# Patient Record
Sex: FEMALE | Race: WHITE
[De-identification: names, ages, dates, MRNs, and addresses within clinical notes are randomized per-mention and may not be internally consistent; named-entity substitution may affect disease eponyms.]

---

## 2017-09-18 ENCOUNTER — HOSPITAL ENCOUNTER (OUTPATIENT)
Dept: HOSPITAL 22 - LAB | Age: 22
End: 2017-09-18
Attending: FAMILY MEDICINE
Payer: COMMERCIAL

## 2017-09-18 DIAGNOSIS — Z01.84: Primary | ICD-10-CM

## 2018-08-29 ENCOUNTER — APPOINTMENT (OUTPATIENT)
Age: 23
Setting detail: DERMATOLOGY
End: 2018-08-29

## 2018-08-29 DIAGNOSIS — L98.8 OTHER SPECIFIED DISORDERS OF THE SKIN AND SUBCUTANEOUS TISSUE: ICD-10-CM

## 2018-08-29 DIAGNOSIS — L40.0 PSORIASIS VULGARIS: ICD-10-CM

## 2018-08-29 PROBLEM — F32.9 MAJOR DEPRESSIVE DISORDER, SINGLE EPISODE, UNSPECIFIED: Status: ACTIVE | Noted: 2018-08-29

## 2018-08-29 PROBLEM — M12.9 ARTHROPATHY, UNSPECIFIED: Status: ACTIVE | Noted: 2018-08-29

## 2018-08-29 PROBLEM — F41.9 ANXIETY DISORDER, UNSPECIFIED: Status: ACTIVE | Noted: 2018-08-29

## 2018-08-29 PROCEDURE — OTHER PRESCRIPTION: OTHER

## 2018-08-29 PROCEDURE — 99202 OFFICE O/P NEW SF 15 MIN: CPT

## 2018-08-29 PROCEDURE — OTHER COUNSELING: OTHER

## 2018-08-29 PROCEDURE — OTHER PRESCRIPTION MEDICATION MANAGEMENT: OTHER

## 2018-08-29 RX ORDER — CLOBETASOL PROPIONATE 0.46 MG/ML
SOLUTION TOPICAL
Qty: 50 | Refills: 1 | Status: ERX | COMMUNITY
Start: 2018-08-29

## 2018-08-29 ASSESSMENT — LOCATION ZONE DERM
LOCATION ZONE: LEG
LOCATION ZONE: SCALP

## 2018-08-29 ASSESSMENT — LOCATION DETAILED DESCRIPTION DERM
LOCATION DETAILED: RIGHT PROXIMAL POSTERIOR THIGH
LOCATION DETAILED: LEFT PROXIMAL POSTERIOR THIGH
LOCATION DETAILED: LEFT SUPERIOR PARIETAL SCALP

## 2018-08-29 ASSESSMENT — LOCATION SIMPLE DESCRIPTION DERM
LOCATION SIMPLE: LEFT POSTERIOR THIGH
LOCATION SIMPLE: RIGHT POSTERIOR THIGH
LOCATION SIMPLE: SCALP

## 2018-08-29 NOTE — PROCEDURE: COUNSELING
Detail Level: Simple
Patient Specific Counseling (Will Not Stick From Patient To Patient): Pt reports joint pain in hands. Pt reports flaring has increased. Cannot r/o whether the symptoms are c/w arthritis or tendonitis. Pt aware to f/u with rheumatologist.
Detail Level: Zone

## 2018-08-29 NOTE — HPI: RASH
How Severe Is Your Rash?: mild
Is This A New Presentation, Or A Follow-Up?: Rash
Additional History: Pt reports when she is hot a rash forms behind the legs. Pt would like to discuss treatment options.

## 2018-08-29 NOTE — PROCEDURE: PRESCRIPTION MEDICATION MANAGEMENT
Detail Level: Simple
Initiate Treatment: Clobetasol Solution
Initiate Treatment: Clobetasol scalp Solution

## 2018-08-29 NOTE — HPI: RASH (DANDRUFF)
How Severe Is It?: moderate
Is This A New Presentation, Or A Follow-Up?: Dandruff
Additional History: Pt reports T-gel shampoo and T-tree shampoos have alleviated symptoms. Pt reports pain on the scalp.

## 2018-10-08 ENCOUNTER — APPOINTMENT (OUTPATIENT)
Age: 23
Setting detail: DERMATOLOGY
End: 2018-10-08

## 2018-10-08 ENCOUNTER — RX ONLY (RX ONLY)
Age: 23
End: 2018-10-08

## 2018-10-08 DIAGNOSIS — L40.0 PSORIASIS VULGARIS: ICD-10-CM

## 2018-10-08 PROCEDURE — OTHER COUNSELING: OTHER

## 2018-10-08 PROCEDURE — OTHER PRESCRIPTION MEDICATION MANAGEMENT: OTHER

## 2018-10-08 PROCEDURE — 99212 OFFICE O/P EST SF 10 MIN: CPT

## 2018-10-08 RX ORDER — CLOBETASOL PROPIONATE 0.46 MG/ML
SOLUTION TOPICAL
Qty: 50 | Refills: 5 | Status: ERX

## 2018-10-08 ASSESSMENT — LOCATION SIMPLE DESCRIPTION DERM: LOCATION SIMPLE: SCALP

## 2018-10-08 ASSESSMENT — LOCATION ZONE DERM: LOCATION ZONE: SCALP

## 2018-10-08 ASSESSMENT — LOCATION DETAILED DESCRIPTION DERM: LOCATION DETAILED: LEFT SUPERIOR PARIETAL SCALP

## 2018-10-08 NOTE — PROCEDURE: COUNSELING
Patient Specific Counseling (Will Not Stick From Patient To Patient): Pt reports joint pain in hands. Pt reports flaring has increased. Cannot r/o whether the symptoms are c/w arthritis or tendonitis. Pt aware to f/u with rheumatologist.\\nAdvised pt that I don't know any rheumatologists in [place].  I recommend contact insurance or PCP for referral options.
Detail Level: Zone

## 2018-10-08 NOTE — PROCEDURE: PRESCRIPTION MEDICATION MANAGEMENT
Detail Level: Simple
Plan: Consider Taclonex or biologic if flaring persist and the Clobetasol scalp Solution is too harsh. Decrease use if Clobetasol Solution to every other day unless aggressively flared. RTC if symptoms recur frequently.
Render In Strict Bullet Format?: No
Continue Regimen: Clobetasol scalp Solution

## 2018-12-19 ENCOUNTER — HOSPITAL ENCOUNTER (OUTPATIENT)
Age: 23
End: 2018-12-19
Payer: COMMERCIAL

## 2018-12-19 DIAGNOSIS — R07.9: Primary | ICD-10-CM

## 2018-12-19 PROCEDURE — 93005 ELECTROCARDIOGRAM TRACING: CPT

## 2018-12-19 PROCEDURE — 93306 TTE W/DOPPLER COMPLETE: CPT

## 2019-09-04 RX ORDER — LEVONORGESTREL AND ETHINYL ESTRADIOL 0.1-0.02MG
1 KIT ORAL DAILY
COMMUNITY

## 2019-09-05 ENCOUNTER — OFFICE VISIT (OUTPATIENT)
Dept: SURGERY | Facility: CLINIC | Age: 24
End: 2019-09-05

## 2019-09-05 VITALS
TEMPERATURE: 98 F | SYSTOLIC BLOOD PRESSURE: 128 MMHG | OXYGEN SATURATION: 98 % | HEIGHT: 67 IN | HEART RATE: 74 BPM | WEIGHT: 153 LBS | BODY MASS INDEX: 24.01 KG/M2 | DIASTOLIC BLOOD PRESSURE: 90 MMHG

## 2019-09-05 DIAGNOSIS — K60.2 ANAL FISSURE: Primary | ICD-10-CM

## 2019-09-05 DIAGNOSIS — R19.8 IRREGULAR BOWEL HABITS: ICD-10-CM

## 2019-09-05 PROCEDURE — 99243 OFF/OP CNSLTJ NEW/EST LOW 30: CPT | Performed by: PHYSICIAN ASSISTANT

## 2019-09-05 RX ORDER — EZETIMIBE 10 MG/1
10 TABLET ORAL DAILY
COMMUNITY

## 2019-09-05 NOTE — PROGRESS NOTES
Concepcion Rousseau is a 23 y.o. female who is seen as a consult at the request of Dr. Flaco Coto for anorectal pain and bleeding    HPI:    Pt c/o sensitive bowels.  Ever since cholecystectomy in 2014, she alternates between constipation and diarrhea.  When she is constipated, she feels tearing at the anus and notes bright red blood  She feels extra tissue on the outside of her anus  She applied HC cream, which has helped with itching    She eats oatmeal every morning, which is helpful for her BMs  She does not take a fiber supplement  She does not take a stool softener    She has never had anorectal surgery  No prior history fissure, fistula, abscess    No known family history of colon polyps or colon cancer    She is a pharmacy student    Past Medical History:   Diagnosis Date   • Diarrhea of infectious origin 02/21/2019    SEEN AT [facility]   • IBS (irritable bowel syndrome)    • Right-sided thoracic back pain 08/04/2017    SEEN AT [facility]       Past Surgical History:   Procedure Laterality Date   • CHOLECYSTECTOMY N/A 2014   • KNEE ARTHROSCOPY Right 2014   • TONSILLECTOMY Bilateral     DURING CHILDHOOD   • WISDOM TOOTH EXTRACTION Bilateral 2016       Social History:   reports that she has never smoked. She has never used smokeless tobacco. She reports that she drinks alcohol. She reports that she does not use drugs.      Marriage status: Single    Family History   Problem Relation Age of Onset   • Diabetes Father          Current Outpatient Medications:   •  ezetimibe (ZETIA) 10 MG tablet, Take 10 mg by mouth Daily., Disp: , Rfl:   •  levonorgestrel-ethinyl estradiol (AVIANE,ALESSE,LESSINA) 0.1-20 MG-MCG per tablet, Take 1 tablet by mouth Daily., Disp: , Rfl:     Allergy  Penicillins and Peroxyl [hydrogen peroxide-benzy alc]    Review of Systems   Constitution: Negative for decreased appetite, weakness and weight gain.   HENT: Positive for congestion. Negative for hearing loss and hoarse voice.    Eyes:  Negative for blurred vision, discharge and visual disturbance.   Cardiovascular: Negative for chest pain, cyanosis and leg swelling.   Respiratory: Positive for cough, shortness of breath and sleep disturbances due to breathing. Negative for snoring.    Endocrine: Negative for cold intolerance and heat intolerance.   Hematologic/Lymphatic: Does not bruise/bleed easily.   Skin: Negative for itching, poor wound healing and skin cancer.   Musculoskeletal: Negative for arthritis, back pain, joint pain and joint swelling.   Gastrointestinal: Positive for change in bowel habit, bowel incontinence and constipation. Negative for abdominal pain.   Genitourinary: Negative for bladder incontinence, dysuria and hematuria.   Neurological: Positive for headaches and light-headedness. Negative for brief paralysis, excessive daytime sleepiness, dizziness and focal weakness.   Psychiatric/Behavioral: Negative for altered mental status and hallucinations. The patient does not have insomnia.    Allergic/Immunologic: Negative for HIV exposure and persistent infections.   All other systems reviewed and are negative.      Vitals:    09/05/19 1508   BP: 128/90   Pulse: 74   Temp: 98 °F (36.7 °C)   SpO2: 98%     Body mass index is 23.96 kg/m².    Physical Exam   Constitutional: She is oriented to person, place, and time. She appears well-developed and well-nourished. No distress.   HENT:   Head: Normocephalic and atraumatic.   Eyes: Pupils are equal, round, and reactive to light.   Neck: No tracheal deviation present.   Pulmonary/Chest: Effort normal. No respiratory distress.   Abdominal: She exhibits no distension.   Genitourinary:   Genitourinary Comments: Perianal exam: external: posterior fissure with associated sentinel pile   Neurological: She is alert and oriented to person, place, and time.   Skin: Skin is warm and dry. She is not diaphoretic.   Psychiatric: She has a normal mood and affect. Her behavior is normal.   Vitals  reviewed.      Review of Medical Record: no pertinent records available for review    Assessment:  1. Anal fissure    2. Irregular bowel habits        Plan:      I discussed with patient options on treating fissure: lateral internal anal sphincterotomy, chemical sphincterotomy with Botox, or topical creams of nitroglycerin, diltiazem, or nifedipine.  I discussed risks and benefits of all.  Risks of the lateral internal anal sphincterotomy are small chance of fecal incontinence, slow wound healing, and it is an invasive procedure versus the other 2 options, but the benefit is 97% success in fixing a fissure.  Chemical sphincterotomy has the benefit of no permanent fecal incontinence but a 80-85% success rate.  The topical creams have no incontinence risk, but are slow to heal the fissure and are only 80% successful.    I wrote for diltiazem and lidocaine cream to be placed on the anus 3 times a day.  I recommended taking 1/4 to 1/2 dose MiraLAX and fiber daily.  I gave out written instructions.       Discussed would not recommend excision of sentinel pile, as it is scar tissue, and removing it would cause even more scar tissue.  She understands.    Patient to follow-up in 5 weeks.      Flower Reich PA-C  9/5/2019     40 minutes spent face-to-face with patient; 25 of 40 minutes spent in consultation

## 2019-10-09 ENCOUNTER — OFFICE VISIT (OUTPATIENT)
Dept: SURGERY | Facility: CLINIC | Age: 24
End: 2019-10-09

## 2019-10-09 VITALS
HEART RATE: 63 BPM | BODY MASS INDEX: 23.93 KG/M2 | DIASTOLIC BLOOD PRESSURE: 72 MMHG | SYSTOLIC BLOOD PRESSURE: 110 MMHG | OXYGEN SATURATION: 99 % | TEMPERATURE: 98.5 F | WEIGHT: 152.5 LBS | HEIGHT: 67 IN

## 2019-10-09 DIAGNOSIS — R19.8 IRREGULAR BOWEL HABITS: ICD-10-CM

## 2019-10-09 DIAGNOSIS — K60.2 ANAL FISSURE: Primary | ICD-10-CM

## 2019-10-09 DIAGNOSIS — Z83.71 FAMILY HISTORY OF COLONIC POLYPS: ICD-10-CM

## 2019-10-09 PROCEDURE — 99213 OFFICE O/P EST LOW 20 MIN: CPT | Performed by: COLON & RECTAL SURGERY

## 2019-10-09 RX ORDER — OLOPATADINE HYDROCHLORIDE 1 MG/ML
SOLUTION/ DROPS OPHTHALMIC
Refills: 10 | COMMUNITY
Start: 2019-09-06

## 2019-10-09 RX ORDER — CLINDAMYCIN HYDROCHLORIDE 150 MG/1
150 CAPSULE ORAL 3 TIMES DAILY
COMMUNITY

## 2019-10-09 NOTE — PROGRESS NOTES
"Concepcion Rousseau is a 24 y.o. female in for follow up of Anal fissure    Irregular bowel habits    Pt states her pain is much better.  No pain or bleeding with BMs    She is taking fiber gummies and miralax    She is applying dilt/lido cream bid    FamHx: colon polyps bother parents, discovered in the past few weeks    /72 (BP Location: Left arm, Patient Position: Sitting, Cuff Size: Adult)   Pulse 63   Temp 98.5 °F (36.9 °C) (Oral)   Ht 170.2 cm (67\")   Wt 69.2 kg (152 lb 8 oz)   SpO2 99%   BMI 23.88 kg/m²   Body mass index is 23.88 kg/m².      PE:  Physical Exam   Constitutional: She appears well-developed. No distress.   HENT:   Head: Normocephalic and atraumatic.   Abdominal: Soft. She exhibits no distension.   Musculoskeletal: Normal range of motion.   Neurological: She is alert.   Psychiatric: Thought content normal.         Assessment:   1. Anal fissure    2. Irregular bowel habits         Plan:    As she has had significant improvement in her symptoms, she should continue medical management.  Continue daily fiber supplement and prn miralax.  She should continue to apply dilt/lido for 2 weeks past resolution of symptoms.    For FamHx colon polyps in her parents, discussed she will need to begin screening colonoscopies at age 41.    Call or come in if symptoms recur, or if any questions or concerning symptoms      RTC prn      Scribed for Yared Felix MD by Flower Reich PA-C  10/9/2019   This patient was evaluated by me, recommendations made, documentation reviewed, edited, and revised by me, Yared Felix MD        "

## 2020-12-02 ENCOUNTER — TELEPHONE (OUTPATIENT)
Dept: GENETICS | Facility: HOSPITAL | Age: 25
End: 2020-12-02

## 2021-02-01 ENCOUNTER — HOSPITAL ENCOUNTER (EMERGENCY)
Age: 26
Discharge: HOME | End: 2021-02-01
Payer: COMMERCIAL

## 2021-02-01 VITALS
TEMPERATURE: 98.24 F | OXYGEN SATURATION: 98 % | SYSTOLIC BLOOD PRESSURE: 121 MMHG | HEART RATE: 76 BPM | RESPIRATION RATE: 18 BRPM | DIASTOLIC BLOOD PRESSURE: 77 MMHG

## 2021-02-01 VITALS
SYSTOLIC BLOOD PRESSURE: 121 MMHG | OXYGEN SATURATION: 98 % | RESPIRATION RATE: 18 BRPM | DIASTOLIC BLOOD PRESSURE: 77 MMHG | HEART RATE: 76 BPM | TEMPERATURE: 98.24 F

## 2021-02-01 VITALS — BODY MASS INDEX: 24.3 KG/M2

## 2021-02-01 DIAGNOSIS — Z88.0: ICD-10-CM

## 2021-02-01 DIAGNOSIS — Z20.822: Primary | ICD-10-CM

## 2021-02-01 PROCEDURE — 99202 OFFICE O/P NEW SF 15 MIN: CPT

## 2021-02-01 PROCEDURE — G0463 HOSPITAL OUTPT CLINIC VISIT: HCPCS

## 2021-02-01 PROCEDURE — U0003 INFECTIOUS AGENT DETECTION BY NUCLEIC ACID (DNA OR RNA); SEVERE ACUTE RESPIRATORY SYNDROME CORONAVIRUS 2 (SARS-COV-2) (CORONAVIRUS DISEASE [COVID-19]), AMPLIFIED PROBE TECHNIQUE, MAKING USE OF HIGH THROUGHPUT TECHNOLOGIES AS DESCRIBED BY CMS-2020-01-R: HCPCS

## 2021-12-22 ENCOUNTER — HOSPITAL ENCOUNTER (OUTPATIENT)
Age: 26
End: 2021-12-22
Payer: COMMERCIAL

## 2021-12-22 DIAGNOSIS — Z20.822: Primary | ICD-10-CM

## 2021-12-22 PROCEDURE — C9803 HOPD COVID-19 SPEC COLLECT: HCPCS

## 2021-12-22 PROCEDURE — U0003 INFECTIOUS AGENT DETECTION BY NUCLEIC ACID (DNA OR RNA); SEVERE ACUTE RESPIRATORY SYNDROME CORONAVIRUS 2 (SARS-COV-2) (CORONAVIRUS DISEASE [COVID-19]), AMPLIFIED PROBE TECHNIQUE, MAKING USE OF HIGH THROUGHPUT TECHNOLOGIES AS DESCRIBED BY CMS-2020-01-R: HCPCS

## 2021-12-22 PROCEDURE — U0005 INFEC AGEN DETEC AMPLI PROBE: HCPCS

## 2022-08-10 ENCOUNTER — HOSPITAL ENCOUNTER (OUTPATIENT)
Age: 27
End: 2022-08-10
Payer: COMMERCIAL

## 2022-08-10 DIAGNOSIS — U07.1: Primary | ICD-10-CM

## 2022-08-10 LAB
HCT VFR BLD CALC: 40.7 % (ref 37–47)
HGB BLD-MCNC: 13.1 G/DL (ref 12.2–16.2)
MCHC RBC-ENTMCNC: 32.3 G/DL (ref 31.8–35.4)
MCV RBC: 95.4 FL (ref 81–99)
MEAN CORPUSCULAR HEMOGLOBIN: 30.8 PG (ref 27–31.2)
PLATELET # BLD: 204 K/MM3 (ref 142–424)
RBC # BLD AUTO: 4.26 M/MM3 (ref 4.2–5.4)
WBC # BLD AUTO: 8.8 K/MM3 (ref 4.8–10.8)

## 2022-08-10 PROCEDURE — C9803 HOPD COVID-19 SPEC COLLECT: HCPCS

## 2022-08-10 PROCEDURE — 87798 DETECT AGENT NOS DNA AMP: CPT

## 2022-08-10 PROCEDURE — 87632 RESP VIRUS 6-11 TARGETS: CPT

## 2022-08-10 PROCEDURE — 36415 COLL VENOUS BLD VENIPUNCTURE: CPT

## 2022-08-10 PROCEDURE — U0003 INFECTIOUS AGENT DETECTION BY NUCLEIC ACID (DNA OR RNA); SEVERE ACUTE RESPIRATORY SYNDROME CORONAVIRUS 2 (SARS-COV-2) (CORONAVIRUS DISEASE [COVID-19]), AMPLIFIED PROBE TECHNIQUE, MAKING USE OF HIGH THROUGHPUT TECHNOLOGIES AS DESCRIBED BY CMS-2020-01-R: HCPCS

## 2022-08-10 PROCEDURE — U0005 INFEC AGEN DETEC AMPLI PROBE: HCPCS

## 2022-08-10 PROCEDURE — 85025 COMPLETE CBC W/AUTO DIFF WBC: CPT

## 2022-08-10 PROCEDURE — 87581 M.PNEUMON DNA AMP PROBE: CPT

## 2022-08-17 ENCOUNTER — DOCUMENTATION (OUTPATIENT)
Dept: OTHER | Facility: HOSPITAL | Age: 27
End: 2022-08-17

## 2022-08-17 NOTE — PROGRESS NOTES
Concepcion Rousseau, a 26-year-old female, was referred for genetic counseling due to a family history of breast cancer. Genetic counseling was provided via telephone.  Ms. Rousseau has no personal history of cancer.  She was 11/12 years old at menarche and nulliparous. She is pre-menopausal and retains her uterus and ovaries.  Ms. Rousseau’s currently gets annual clinical breast exams. She has not yet had a colonoscopy.  She was interested in discussing her risk for a hereditary cancer syndrome.   Ms. Rousseau decided to pursue comprehensive genetic testing to evaluate her risk of cancer, therefore the CancerNext Panel was ordered through [facility] which analyzes 36 genes associated with an increased cancer risk. Genetic testing was negative for pathogenic mutations in BRCA1/2 and 34 additional genes on this panel. These normal results were discussed with Ms. Rousseau by telephone on 8/17/22.    PERTINENT FAMILY HISTORY: (See attached pedigree)   Mat Grandmother:   Breast cancer, 48      Reported PALB2 (BRCA3)+  Mat Great Aunt 1:  Breast cancer, 42  Mat Great Aunt 2:  Ovarian cancer, 49  Pat Grandmother:  Breast cancer    We do not have medical records regarding any of these diagnoses.       RISK ASSESSMENT:  Ms. Rousseau’s family history of breast cancer raises the question of a hereditary cancer syndrome.  NCCN guidelines for genetic testing for BRCA1/2 states that individuals with a close relative with breast cancer under the age of 45 may consider genetic testing. With Ms. Rousseau’s mother’s diagnosis being at age 48, she would not meet this criteria. Despite this, we discussed how genetic testing is still available to her.  This risk assessment is based on the family history information provided at the time of the appointment.  The assessment could change in the future should new information be obtained.    Because genetic testing was negative, her management should be guided by a family history-based risk assessment.  Phylliser-Hilariozick, version 8 is able to take into account personal factors (age at first menarche, age at first birth, etc.) and family history when calculating risk for breast cancer. Computer modeling estimates that Ms. Rousseau’s lifetime personal risk for developing breast cancer is up to 23.7% (Floridalma, v8), compared to the general population risk of 12.5%.  A risk greater than 20% warrants consideration of increased screening for Ms. Rousseau per NCCN guidelines.  This risk assessment is based on the family history information provided at the time of the appointment and could change in the future should new information be obtained.    GENETIC COUNSELING (30 minutes):  We reviewed the family history information in detail. Cases of cancer follow three general patterns: sporadic, familial, and hereditary.  While most cancer is sporadic, some cases appear to occur in family clusters.  These cases are said to be familial and account for 10-20% of cancer cases.  Familial cases may be due to a combination of shared genes and environmental factors among family members.  In even fewer cases, the risk for cancer is inherited, and the genes responsible for the increased cancer risk are known.       Family histories typical of hereditary cancer syndromes usually include multiple first- and second-degree relatives diagnosed with cancer types that define a syndrome.  These cases tend to be diagnosed at younger-than-expected ages and can be bilateral or multifocal.  The cancer in these families follows an autosomal dominant inheritance pattern, which indicates the likely presence of a mutation in a cancer susceptibility gene.  Children and siblings of an individual believed to carry this mutation have a 50% chance of inheriting that mutation, thereby inheriting the increased risk to develop cancer.  These mutations can be passed down from the maternal or the paternal lineage.     Due to Ms. Rousseau’s family history of breast  cancer, we discussed hereditary breast cancer. Hereditary breast cancer accounts for 5-10% of all cases of breast cancer.  A significant proportion (50%) of hereditary breast cancer can be attributed to mutations in the BRCA1 and BRCA2 genes.  Mutations in these genes confer an increased risk for breast cancer, ovarian cancer, male breast cancer, prostate cancer, and pancreatic cancer.  Women with a BRCA1 or BRCA2 mutation have up to an 87% lifetime risk of breast cancer and up to a 44% risk of ovarian cancer.  There are other clinically significant breast cancer related genes in addition to BRCA1/2, including PALB2, SANDRITA, and CHEK2.     There are other hereditary cancer syndromes as well. Based on Ms. Rousseau’s family history and her desire to get more information regarding her personal risks, testing was pursued through a multigene panel evaluating several other genes known to increase the risk for cancer.     GENETIC TESTING:  The risks, benefits, and limitations of genetic testing and implications for clinical management following testing were reviewed.  DNA test results can influence decisions regarding screening and prevention.  Genetic testing can have significant psychological implications for both individuals and families. Also discussed was the possibility of employment and insurance discrimination based on genetic test results and the laws in place to prevent this, as well as the limitations of these laws.       We discussed panel testing, which would involve testing 36 genes associated with increased cancer risk. The implications of a positive or negative test result were discussed.  We also discussed the importance of testing an affected relative and how a negative result for Ms. Rousseau wouldn’t necessarily mean the cancers presenting in her family weren’t due to a genetic mutation that Ms. Rousseau did not inherit.  In general, a negative genetic test result is most informative if a mutation has first been  established in an affected member of the family.  In cases where an affected individual is not available or interested in testing, it is appropriate to offer testing to an unaffected individual. We discussed the possibility that, in some cases, genetic test results may be ambiguous due to the identification of a genetic variant. These variants may or may not be associated with an increased cancer risk. With multigene panel testing, it is not uncommon for a variant of uncertain significance (VUS) to be identified.  If a VUS is identified, testing family members is typically not recommended and screening recommendations are made based on the family history.  The laboratories that perform genetic testing work to reclassify the VUS and send out an amended report if and when a VUS is reclassified.  The majority of variant findings are ultimately reclassified to a negative result. Given Ms. Rousseau’s family history, a negative test result does not eliminate all cancer risk, although the risk would not be as high as it would with positive genetic testing.     TEST RESULTS: Genetic testing was negative for known pathogenic mutations by sequencing and rearrangement testing for the 36 genes on the CancerNext panel (see attached).  This negative result greatly lowers, but does not eliminate, the risk of a hereditary cancer syndrome for Ms. Rousseau. It is possible that the family history is due to a hereditary cancer syndrome that Ms. Rousseau did not happen to inherit. This assessment is based on the information provided at the time of the consultation.     CLINICAL MANAGEMENT GUIDELINES: Options available to individuals with an increased lifetime risk (greater than 20%) for breast cancer was discussed, including increased surveillance and chemoprevention.  Given her family history, Ms. Rousseau is at an increased lifetime risk for breast cancer, which warrants increased surveillance in the future.     Increased surveillance, based  on NCCN guidelines, would consist of semi-annual clinical breast exam and annual mammography starting 10 years prior to the earliest breast cancer diagnosis in the family, or age 40, whichever is earliest.  According to an American Cancer Society expert panel and NCCN guidelines, annual breast MRI should be offered to women whose lifetime risk of breast cancer is 20-25 percent or more, typically beginning at the same age as mammography. Breast cancer chemoprevention is another option that can be considered in the future. Studies have shown that Tamoxifen and Raloxifene can cut the risk of estrogen receptor positive breast cancer by 50% when taken by high-risk women over a 5-year period.  There are risks and side effects associated with these medications; therefore, the risks versus benefits must be considered prior to deciding to take chemopreventative medications. These assessments are based on the information provided at the time of consultation and could change should new information become available.    PLAN: Genetic counseling remains available to Ms. Rousseau and her family.  We discussed the high risk breast clinic at [facility].  She plans to have her OBGYN schedule her breast cancer screenings for the time being.  We discussed if she ever would like to be seen in the high risk clinic, she could contact us for referral information.  If she has any questions in the future, she is welcome to call me at [phone].      Jessika Kelly, MS, [facility] [place], [facility]  Licensed Certified Genetic Counselor    Cc: Concepcion Torres MD

## 2024-01-17 ENCOUNTER — PATIENT ROUNDING (BHMG ONLY) (OUTPATIENT)
Dept: INTERNAL MEDICINE | Facility: CLINIC | Age: 29
End: 2024-01-17
Payer: COMMERCIAL

## 2024-01-17 ENCOUNTER — OFFICE VISIT (OUTPATIENT)
Dept: INTERNAL MEDICINE | Facility: CLINIC | Age: 29
End: 2024-01-17
Payer: COMMERCIAL

## 2024-01-17 VITALS
WEIGHT: 162.8 LBS | BODY MASS INDEX: 25.55 KG/M2 | TEMPERATURE: 98.6 F | OXYGEN SATURATION: 98 % | HEIGHT: 67 IN | DIASTOLIC BLOOD PRESSURE: 68 MMHG | HEART RATE: 90 BPM | SYSTOLIC BLOOD PRESSURE: 118 MMHG

## 2024-01-17 DIAGNOSIS — Z83.3 FAMILY HISTORY OF DIABETES MELLITUS IN FATHER: ICD-10-CM

## 2024-01-17 DIAGNOSIS — E78.01 FAMILIAL HYPERCHOLESTEROLEMIA: ICD-10-CM

## 2024-01-17 DIAGNOSIS — Z01.419 ENCOUNTER FOR WELL WOMAN EXAM WITH ROUTINE GYNECOLOGICAL EXAM: ICD-10-CM

## 2024-01-17 DIAGNOSIS — Z80.3 FAMILY HISTORY OF BREAST CANCER: ICD-10-CM

## 2024-01-17 DIAGNOSIS — Z00.00 HEALTHCARE MAINTENANCE: Primary | ICD-10-CM

## 2024-01-17 DIAGNOSIS — K58.2 IRRITABLE BOWEL SYNDROME WITH BOTH CONSTIPATION AND DIARRHEA: ICD-10-CM

## 2024-01-17 DIAGNOSIS — J34.2 NASAL SEPTAL DEVIATION: ICD-10-CM

## 2024-01-17 DIAGNOSIS — R09.81 CHRONIC NASAL CONGESTION: ICD-10-CM

## 2024-01-17 RX ORDER — MAGNESIUM 200 MG
TABLET ORAL
COMMUNITY

## 2024-01-17 RX ORDER — DICYCLOMINE HCL 20 MG
20 TABLET ORAL EVERY 6 HOURS PRN
Qty: 30 TABLET | Refills: 2 | Status: SHIPPED | OUTPATIENT
Start: 2024-01-17

## 2024-01-17 RX ORDER — DICYCLOMINE HCL 20 MG
20 TABLET ORAL EVERY 6 HOURS
COMMUNITY
End: 2024-01-17 | Stop reason: SDUPTHER

## 2024-01-17 NOTE — PROGRESS NOTES
January 17, 2024    PATIENT ROUNDING OBTAINED AT CHECK OUT.    PATIENT WAS REFERRED TO OUR OFFICE BY A NURSE PRACTITIONER FRIEND OF HER.  SHE HAD NO DIFFICULTIES MAKING HER NEW PATIENT APPOINTMENT.      SHE HAD A GREAT EXPERIENCE IN THE OFFICE TODAY FROM THE TIME SHE CHECKED IN UNTIL THE TIME SHE CHECKED OUT, AND COULD THINK OF NO RECOMMENDATIONS THAT WOULD HAVE MADE HER EXPERIENCE ANY BETTER TODAY.    SHE WOULD DEFINITELY RECOMMEND OUR OFFICE TO FAMILY AND FRIENDS.

## 2024-01-17 NOTE — PROGRESS NOTES
Subjective   Concepcion Rousseau is a 28 y.o. female.     Chief Complaint   Patient presents with    Hyperlipidemia    Annual Exam     Pt is here as a new pt to est care. Pt is fasting.    Irritable Bowel Syndrome     Pt c/o diarrhea X 2 weeks.        History of Present Illness   She is here today as a new patient to establish care and for CPE. She is fasting today for lab work. She has been struggling with healthy eating and regular exercise.  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  She is in the process of building a house and planning her wedding.  She notes an increase in stress and anxiety with this.  She is originally from [place]. She moved to [place] last year. She is a pharmacist at [employer].  She notes chronic difficulty breathing through the nose.  This occurs in both nostrils.  She denies any allergy symptoms.  She denies any history of nasal trauma.  She has tried nasal steroid sprays without improvement.    HLD-currently on Zetia 10 mg daily. She has a hx of familial hypercholesterolemia.  She has been struggling some with healthy eating and regular exercise.  Family history of diabetes- in her father.  She is concerned for predisposition for diabetes and would like to complete an A1c today.    IBS-with diarrhea and constipation. Dx in 2017 by previous PCP. She currently uses Bentyl 20 mg as needed. She notes over the past 2 weeks frequent diarrhea, averaging 3 watery stools a day. This typically occurs shortly after eating.  She notes lower abdominal cramping with this that improves with a bowel movement. She notes an increase in stress and anxiety recently.  In the past her diarrhea will typically last a few days and then improved.  She notes this most recent episode has persisted for much longer than normal. She has started a probiotic with some improvement in diarrhea.  She notes triggers include stress and anxiety.  She has been trying to eat a bland diet.  She denies any BRBPR, melena,  fever or chills.  She has never been evaluated by gastroenterology.    GYN- she is followed by GYN in [place]. UTD with pap. She has the copper IUD. Menses is heavier.  G0.  Positive family history of breast cancer in her maternal grandmother, BRCA positive.  Patient states that she had BRCA testing done with previous GYN, negative. She is needing to establish with a new gynecologist in [place].    The following portions of the patient's history were reviewed and updated as appropriate: allergies, current medications, past family history, past medical history, past social history, past surgical history, and problem list.    Review of Systems   Constitutional:  Negative for appetite change, chills, diaphoresis, fatigue, fever, unexpected weight gain and unexpected weight loss.   HENT:  Positive for congestion. Negative for dental problem, ear pain, hearing loss, mouth sores, nosebleeds, postnasal drip, rhinorrhea, sinus pressure, sore throat, swollen glands and trouble swallowing.         Difficulty breathing from the nose.   Eyes:  Negative for blurred vision, pain, discharge, redness, itching and visual disturbance.   Respiratory:  Negative for cough, chest tightness, shortness of breath and wheezing.    Cardiovascular:  Negative for chest pain, palpitations and leg swelling.   Gastrointestinal:  Positive for diarrhea. Negative for abdominal distention, abdominal pain, blood in stool, constipation, nausea, vomiting and GERD.   Endocrine: Negative for cold intolerance and heat intolerance.   Genitourinary:  Negative for breast discharge, breast lump, breast pain, difficulty urinating, dyspareunia, dysuria, flank pain, frequency, genital sores, hematuria, menstrual problem, pelvic pain, pelvic pressure, urgency, urinary incontinence, vaginal bleeding and vaginal discharge.   Musculoskeletal:  Negative for arthralgias, back pain, gait problem, joint swelling, myalgias and neck pain.   Skin:  Negative for color  change, rash and skin lesions.   Allergic/Immunologic: Negative for environmental allergies and food allergies.   Neurological:  Negative for dizziness, tremors, seizures, syncope, speech difficulty, weakness, light-headedness, numbness, headache, memory problem and confusion.   Hematological:  Negative for adenopathy. Does not bruise/bleed easily.   Psychiatric/Behavioral:  Positive for stress. Negative for sleep disturbance, suicidal ideas and depressed mood. The patient is nervous/anxious.        Objective   Physical Exam  Constitutional:       Appearance: Normal appearance. She is well-developed.   HENT:      Head: Normocephalic and atraumatic.      Right Ear: Hearing, tympanic membrane, ear canal and external ear normal.      Left Ear: Hearing, tympanic membrane, ear canal and external ear normal.      Nose: Septal deviation and congestion present.      Right Turbinates: Not enlarged.      Left Turbinates: Not enlarged.      Right Sinus: No maxillary sinus tenderness or frontal sinus tenderness.      Left Sinus: No maxillary sinus tenderness or frontal sinus tenderness.      Mouth/Throat:      Lips: Pink.      Mouth: Mucous membranes are moist.      Dentition: Normal dentition.      Tongue: No lesions.      Pharynx: Oropharynx is clear. Uvula midline.      Tonsils: No tonsillar exudate.   Eyes:      General: Lids are normal.      Extraocular Movements: Extraocular movements intact.      Conjunctiva/sclera: Conjunctivae normal.      Pupils: Pupils are equal, round, and reactive to light.   Neck:      Thyroid: No thyroid mass, thyromegaly or thyroid tenderness.      Vascular: No carotid bruit.      Trachea: Trachea normal.   Cardiovascular:      Rate and Rhythm: Normal rate and regular rhythm.      Pulses: Normal pulses.           Radial pulses are 2+ on the right side and 2+ on the left side.        Popliteal pulses are 2+ on the right side and 2+ on the left side.        Dorsalis pedis pulses are 2+ on the  right side and 2+ on the left side.        Posterior tibial pulses are 2+ on the right side and 2+ on the left side.      Heart sounds: S1 normal and S2 normal.   Pulmonary:      Effort: Pulmonary effort is normal.      Breath sounds: Normal breath sounds.   Abdominal:      General: Bowel sounds are increased. There is no distension or abdominal bruit.      Palpations: Abdomen is soft. There is no hepatomegaly or splenomegaly.      Tenderness: There is no abdominal tenderness.      Hernia: No hernia is present.   Musculoskeletal:      Cervical back: Normal range of motion and neck supple.      Right lower leg: No edema.      Left lower leg: No edema.   Lymphadenopathy:      Head:      Right side of head: No submental, submandibular, tonsillar or occipital adenopathy.      Left side of head: No submental, submandibular, tonsillar or occipital adenopathy.      Cervical: No cervical adenopathy.      Upper Body:      Right upper body: No supraclavicular adenopathy.      Left upper body: No supraclavicular adenopathy.      Lower Body: No right inguinal adenopathy. No left inguinal adenopathy.   Skin:     General: Skin is warm and dry.      Findings: No rash.      Nails: There is no clubbing.   Neurological:      General: No focal deficit present.      Mental Status: She is alert and oriented to person, place, and time.      Cranial Nerves: Cranial nerves 2-12 are intact.      Sensory: Sensation is intact.      Motor: Motor function is intact.      Coordination: Coordination is intact.      Gait: Gait is intact.      Deep Tendon Reflexes:      Reflex Scores:       Patellar reflexes are 2+ on the right side and 2+ on the left side.  Psychiatric:         Attention and Perception: Attention and perception normal.         Mood and Affect: Affect normal. Mood is anxious.         Speech: Speech normal.         Behavior: Behavior normal. Behavior is cooperative.         Thought Content: Thought content normal.         Cognition  and Memory: Cognition and memory normal.         Judgment: Judgment normal.         Vitals:    01/17/24 0820   BP: 118/68   Pulse: 90   Temp: 98.6 °F (37 °C)   SpO2: 98%      Body mass index is 25.5 kg/m².    Assessment & Plan   Problems Addressed this Visit       Irritable bowel syndrome with both constipation and diarrhea    Relevant Medications    dicyclomine (BENTYL) 20 MG tablet    Other Relevant Orders    CBC & Differential    Comprehensive Metabolic Panel    TSH Rfx On Abnormal To Free T4    Ambulatory Referral to Gastroenterology    Family history of diabetes mellitus in father    Relevant Orders    Comprehensive Metabolic Panel    Hemoglobin A1c    Familial hypercholesterolemia    Relevant Orders    Comprehensive Metabolic Panel    Lipid Panel With LDL / HDL Ratio    TSH Rfx On Abnormal To Free T4    Family history of breast cancer    Relevant Orders    Ambulatory Referral to Gynecology (Completed)     Other Visit Diagnoses       Healthcare maintenance    -  Primary    Relevant Orders    CBC & Differential    Comprehensive Metabolic Panel    Hemoglobin A1c    Hepatitis C Antibody    Lipid Panel With LDL / HDL Ratio    TSH Rfx On Abnormal To Free T4    Nasal septal deviation        Relevant Orders    Ambulatory Referral to ENT (Otolaryngology) (Completed)    Chronic nasal congestion        Relevant Orders    Ambulatory Referral to ENT (Otolaryngology) (Completed)    Encounter for well woman exam with routine gynecological exam        Relevant Orders    Ambulatory Referral to Gynecology (Completed)          Diagnoses         Codes Comments    Healthcare maintenance    -  Primary ICD-10-CM: Z00.00  ICD-9-CM: V70.0     Irritable bowel syndrome with both constipation and diarrhea     ICD-10-CM: K58.2  ICD-9-CM: 564.1     Family history of diabetes mellitus in father     ICD-10-CM: Z83.3  ICD-9-CM: V18.0     Familial hypercholesterolemia     ICD-10-CM: E78.01  ICD-9-CM: 272.0     Nasal septal deviation      ICD-10-CM: J34.2  ICD-9-CM: 470     Chronic nasal congestion     ICD-10-CM: R09.81  ICD-9-CM: 478.19     Encounter for well woman exam with routine gynecological exam     ICD-10-CM: Z01.419  ICD-9-CM: V72.31     Family history of breast cancer     ICD-10-CM: Z80.3  ICD-9-CM: V16.3           1.  Preventative counseling-encouraged her to return to healthy, balanced diet and start regular exercise program with resistance training.  Ensure adequate dietary intake of calcium and vitamin D.  2.  HLD-check labs today.  Encourage Mediterranean-style diet and return to regular exercise.  Continue Zetia 10 mg daily.  3.  IBS-most recent flare with diarrhea likely related to stress and anxiety.  Prescription for Bentyl refilled today.  Recommend continuing probiotic and try addition of fiber supplement to help bulk stool.  Encouraged healthy eating and return to regular exercise along with stress management techniques.  Referral placed to GI for further evaluation.  4.  Chronic nasal congestion/nasal septal deviation-referral placed to ENT for further evaluation.  5.  Family history of diabetes-check labs today including A1c.    Dentist up-to-date  Eye exam up-to-date  GYN-referral placed today  Encouraged sunscreen use outside.    Fasting labs today, will call with results.  Follow-up in 6 months for hyperlipidemia.

## 2024-01-18 DIAGNOSIS — R74.8 ELEVATED LIVER ENZYMES: ICD-10-CM

## 2024-01-18 DIAGNOSIS — E78.01 FAMILIAL HYPERCHOLESTEROLEMIA: Primary | ICD-10-CM

## 2024-01-18 LAB
ALBUMIN SERPL-MCNC: 4.9 G/DL (ref 3.5–5.2)
ALBUMIN/GLOB SERPL: 2.1 G/DL
ALP SERPL-CCNC: 84 U/L (ref 39–117)
ALT SERPL-CCNC: 39 U/L (ref 1–33)
AST SERPL-CCNC: 25 U/L (ref 1–32)
BASOPHILS # BLD AUTO: 0.04 10*3/MM3 (ref 0–0.2)
BASOPHILS NFR BLD AUTO: 0.5 % (ref 0–1.5)
BILIRUB SERPL-MCNC: 0.3 MG/DL (ref 0–1.2)
BUN SERPL-MCNC: 10 MG/DL (ref 6–20)
BUN/CREAT SERPL: 11.8 (ref 7–25)
CALCIUM SERPL-MCNC: 10.3 MG/DL (ref 8.6–10.5)
CHLORIDE SERPL-SCNC: 104 MMOL/L (ref 98–107)
CHOLEST SERPL-MCNC: 186 MG/DL (ref 0–200)
CO2 SERPL-SCNC: 25.2 MMOL/L (ref 22–29)
CREAT SERPL-MCNC: 0.85 MG/DL (ref 0.57–1)
EGFRCR SERPLBLD CKD-EPI 2021: 96.4 ML/MIN/1.73
EOSINOPHIL # BLD AUTO: 0.15 10*3/MM3 (ref 0–0.4)
EOSINOPHIL NFR BLD AUTO: 1.7 % (ref 0.3–6.2)
ERYTHROCYTE [DISTWIDTH] IN BLOOD BY AUTOMATED COUNT: 12.5 % (ref 12.3–15.4)
GLOBULIN SER CALC-MCNC: 2.3 GM/DL
GLUCOSE SERPL-MCNC: 88 MG/DL (ref 65–99)
HBA1C MFR BLD: 5.1 % (ref 4.8–5.6)
HCT VFR BLD AUTO: 38.7 % (ref 34–46.6)
HCV IGG SERPL QL IA: NON REACTIVE
HDLC SERPL-MCNC: 66 MG/DL (ref 40–60)
HGB BLD-MCNC: 13.3 G/DL (ref 12–15.9)
IMM GRANULOCYTES # BLD AUTO: 0.02 10*3/MM3 (ref 0–0.05)
IMM GRANULOCYTES NFR BLD AUTO: 0.2 % (ref 0–0.5)
LDLC SERPL CALC-MCNC: 112 MG/DL (ref 0–100)
LDLC/HDLC SERPL: 1.7 {RATIO}
LYMPHOCYTES # BLD AUTO: 3.07 10*3/MM3 (ref 0.7–3.1)
LYMPHOCYTES NFR BLD AUTO: 34.9 % (ref 19.6–45.3)
MCH RBC QN AUTO: 30.9 PG (ref 26.6–33)
MCHC RBC AUTO-ENTMCNC: 34.4 G/DL (ref 31.5–35.7)
MCV RBC AUTO: 90 FL (ref 79–97)
MONOCYTES # BLD AUTO: 0.64 10*3/MM3 (ref 0.1–0.9)
MONOCYTES NFR BLD AUTO: 7.3 % (ref 5–12)
NEUTROPHILS # BLD AUTO: 4.87 10*3/MM3 (ref 1.7–7)
NEUTROPHILS NFR BLD AUTO: 55.4 % (ref 42.7–76)
NRBC BLD AUTO-RTO: 0 /100 WBC (ref 0–0.2)
PLATELET # BLD AUTO: 224 10*3/MM3 (ref 140–450)
POTASSIUM SERPL-SCNC: 4.4 MMOL/L (ref 3.5–5.2)
PROT SERPL-MCNC: 7.2 G/DL (ref 6–8.5)
RBC # BLD AUTO: 4.3 10*6/MM3 (ref 3.77–5.28)
SODIUM SERPL-SCNC: 143 MMOL/L (ref 136–145)
TRIGL SERPL-MCNC: 40 MG/DL (ref 0–150)
TSH SERPL DL<=0.005 MIU/L-ACNC: 3.15 UIU/ML (ref 0.27–4.2)
VLDLC SERPL CALC-MCNC: 8 MG/DL (ref 5–40)
WBC # BLD AUTO: 8.79 10*3/MM3 (ref 3.4–10.8)

## 2024-04-01 ENCOUNTER — OFFICE VISIT (OUTPATIENT)
Dept: SURGERY | Facility: CLINIC | Age: 29
End: 2024-04-01
Payer: COMMERCIAL

## 2024-04-01 VITALS
OXYGEN SATURATION: 100 % | HEIGHT: 67 IN | WEIGHT: 159.1 LBS | SYSTOLIC BLOOD PRESSURE: 108 MMHG | DIASTOLIC BLOOD PRESSURE: 86 MMHG | HEART RATE: 72 BPM | BODY MASS INDEX: 24.97 KG/M2

## 2024-04-01 DIAGNOSIS — K62.89 RECTAL PAIN: Primary | ICD-10-CM

## 2024-04-01 DIAGNOSIS — L29.0 PRURITUS ANI: ICD-10-CM

## 2024-04-01 DIAGNOSIS — K64.4 ANAL SKIN TAG: ICD-10-CM

## 2024-04-01 PROCEDURE — 46600 DIAGNOSTIC ANOSCOPY SPX: CPT | Performed by: PHYSICIAN ASSISTANT

## 2024-04-01 RX ORDER — FLUTICASONE PROPIONATE 50 MCG
2 SPRAY, SUSPENSION (ML) NASAL DAILY
COMMUNITY
Start: 2024-02-19

## 2024-04-01 RX ORDER — CLOBETASOL PROPIONATE 0.5 MG/G
1 CREAM TOPICAL TAKE AS DIRECTED
Qty: 15 G | Refills: 0 | Status: SHIPPED | OUTPATIENT
Start: 2024-04-01 | End: 2024-04-15

## 2024-04-01 RX ORDER — SODIUM CHLORIDE, SODIUM LACTATE, POTASSIUM CHLORIDE, CALCIUM CHLORIDE 600; 310; 30; 20 MG/100ML; MG/100ML; MG/100ML; MG/100ML
30 INJECTION, SOLUTION INTRAVENOUS CONTINUOUS
OUTPATIENT
Start: 2024-04-01

## 2024-04-01 RX ORDER — AZELASTINE HYDROCHLORIDE 137 UG/1
SPRAY, METERED NASAL
COMMUNITY
Start: 2024-02-19

## 2024-04-01 NOTE — PROGRESS NOTES
Concepcion Rousseau is a 28 y.o. female who is seen as a consult at the request of Self Referring for Rectal Bleeding.      HPI:    Pt last seen in our office in 2019 for an anal fissure, which was treated conservatively with Diltiazem/Lidocaine compound cream.  She presents today for evaluation of perianal itching and enlarged perianal skin tag.   1.5 weeks ago, developed itching after playing pickle ball.   Applied Lidocaine and HC cream without improvement.   Concerned she had a yeast infection.  Took a Fluconazole tablet with significant improvement.   States she shaved recently and unsure if the itching she is currently experiencing is from shaving or recurrent yeast infection.     Pt states that she had a small anal skin tag in the past, which has doubled in size.   Hard to maintain proper hygiene and feels more irritated from wiping more frequently.   No bleeding.     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  Does not plan to have children.     Not taking any ASA or anticoagulation.   No previous anorectal surgeries.   No previous colonoscopy, but inquires about one due to intermittent GI issues.   Mother and maternal grandmother with Hx of colon polyps (Age of Dx unknown).     Past Medical History:   Diagnosis Date    Cholelithiasis 2015    Gall bladder removed    Diarrhea of infectious origin 02/21/2019    SEEN AT [facility]    Fissure, anal 09/2019    Scar tissue    Hyperlipidemia     IBS (irritable bowel syndrome)     Rectal bleeding 09/2019    Along with anal fissure    Right-sided thoracic back pain 08/04/2017    SEEN AT [facility]       Past Surgical History:   Procedure Laterality Date    CHOLECYSTECTOMY N/A 2014    KNEE ARTHROSCOPY Right 2014    KNEE SURGERY Right     second one    TONSILLECTOMY Bilateral     DURING CHILDHOOD    WISDOM TOOTH EXTRACTION Bilateral 2016       Social History:   reports that she has never smoked. She has never been exposed to tobacco smoke. She  has never used smokeless tobacco. She reports that she does not currently use alcohol. She reports that she does not use drugs.      Marriage status: Single    Family History   Problem Relation Age of Onset    Hyperlipidemia Mother     Heart disease Mother     Diabetes Father     Breast cancer Maternal Grandmother 49        BRCA positive    Hyperlipidemia Maternal Grandmother     Hypertension Maternal Grandmother     Heart disease Maternal Grandmother     Kidney disease Maternal Grandmother         CKD 3    Cancer Maternal Grandmother 80        bladder cancer    Breast cancer Paternal Grandmother     Cancer Paternal Grandmother     Cervical cancer Other     Ovarian cancer Other     Colon cancer Neg Hx          Current Outpatient Medications:     Azelastine HCl 137 MCG/SPRAY solution, , Disp: , Rfl:     dicyclomine (BENTYL) 20 MG tablet, Take 1 tablet by mouth Every 6 (Six) Hours As Needed for Abdominal Cramping., Disp: 30 tablet, Rfl: 2    ezetimibe (ZETIA) 10 MG tablet, Take 1 tablet by mouth Daily., Disp: , Rfl:     fluticasone (FLONASE) 50 MCG/ACT nasal spray, 2 sprays into the nostril(s) as directed by provider Daily., Disp: , Rfl:     Glucosamine-Chondroitin-MSM [id number] MG pack, Take 2 tablets by mouth Daily., Disp: , Rfl:     Magnesium 200 MG tablet, Take  by mouth., Disp: , Rfl:     NIACINAMIDE PO, Take  by mouth., Disp: , Rfl:     NON FORMULARY, Triple calm magnesium, Disp: , Rfl:     clobetasol propionate (TEMOVATE) 0.05 % cream, Apply 1 Application topically to the appropriate area as directed Take As Directed for 14 days., Disp: 15 g, Rfl: 0    Allergy  Penicillins and Peroxyl [hydrogen peroxide-benzy alc]    Review of Systems   Constitutional: Negative for decreased appetite and weight gain.   HENT:  Negative for congestion, hearing loss and hoarse voice.    Eyes:  Negative for blurred vision, discharge and visual disturbance.   Cardiovascular:  Negative for chest pain, cyanosis and leg swelling.    Respiratory:  Negative for cough, shortness of breath, sleep disturbances due to breathing and snoring.    Endocrine: Negative for cold intolerance and heat intolerance.   Hematologic/Lymphatic: Does not bruise/bleed easily.   Skin:  Negative for itching, poor wound healing and skin cancer.   Musculoskeletal:  Negative for arthritis, back pain, joint pain and joint swelling.   Gastrointestinal:  Negative for abdominal pain, change in bowel habit, bowel incontinence and constipation.        Perianal pruritus.    Genitourinary:  Negative for bladder incontinence, dysuria and hematuria.   Neurological:  Negative for brief paralysis, excessive daytime sleepiness, dizziness, focal weakness, headaches, light-headedness and weakness.   Psychiatric/Behavioral:  Negative for altered mental status and hallucinations. The patient does not have insomnia.    Allergic/Immunologic: Negative for HIV exposure and persistent infections.       Vitals:    04/01/24 1324   BP: 108/86   Pulse: 72   SpO2: 100%     Body mass index is 24.92 kg/m².    Physical Exam  Exam conducted with a chaperone present.   Constitutional:       General: She is not in acute distress.     Appearance: She is well-developed.   HENT:      Head: Normocephalic and atraumatic.      Nose: Nose normal.   Eyes:      Conjunctiva/sclera: Conjunctivae normal.      Pupils: Pupils are equal, round, and reactive to light.   Neck:      Trachea: No tracheal deviation.   Pulmonary:      Effort: Pulmonary effort is normal. No respiratory distress.      Breath sounds: Normal breath sounds.   Abdominal:      General: Bowel sounds are normal. There is no distension.      Palpations: Abdomen is soft.   Genitourinary:     Comments: Perianal exam: Posterior anal skin tag. External hemorrhoids WNL. No anal fissure visualized. No perianal erythema or rash noted.   [place]- Good tone, no masses  Anoscopy performed: Internal hemorrhoids WNL.  Musculoskeletal:         General: No deformity.  Normal range of motion.      Cervical back: Normal range of motion.   Skin:     General: Skin is warm and dry.   Neurological:      Mental Status: She is alert and oriented to person, place, and time.      Cranial Nerves: No cranial nerve deficit.      Coordination: Coordination normal.      Gait: Gait normal.   Psychiatric:         Behavior: Behavior normal.         Judgment: Judgment normal.         Review of Medical Record:  I reviewed medical records as detailed in HPI.     Assessment:  1. Rectal pain    2. Anal skin tag    3. Pruritus ani    - New    Plan:  - Pruritus Ani: Rx for Clobetasol cream provided. Pt instructed to apply BID x1 week, then QD for 1 week. After this will transition to a barrier cream. Pt encouraged to avoid excessive itching/wiping.   - Discussed cause of anal skin tag and that we typically do not recommend the removal of the tags as the scar tissue post-excision may be similar in size to current skin tag, therefore not yielding desirable results. Pt expresses interest in skin tag removal and therefore discussed the procedure, expected 3-4 week recovery, as well as risks and benefits. Pt expresses understanding and would like to proceed. Will schedule colonoscopy with possible anal skin tag excision. Will arrange follow up with Dr. Felix prior to procedure.

## 2024-04-05 ENCOUNTER — PATIENT ROUNDING (BHMG ONLY) (OUTPATIENT)
Dept: SURGERY | Facility: CLINIC | Age: 29
End: 2024-04-05
Payer: COMMERCIAL

## 2024-04-05 NOTE — PROGRESS NOTES
April 5, 2024    Hello, may I speak with Concepcion Rousseau?    My name is Juanito      I am  with [facility]  [facility] GENERAL SURGERY  [address] [place] [id number] [phone].    Before we get started may I verify your date of birth? 1995    I am calling to officially welcome you to our practice and ask about your recent visit. Is this a good time to talk? yes    Tell me about your visit with us. What things went well?  She was wonderful.        We're always looking for ways to make our patients' experiences even better. Do you have recommendations on ways we may improve?  no    Overall were you satisfied with your first visit to our practice? yes       I appreciate you taking the time to speak with me today. Is there anything else I can do for you? no      Thank you, and have a great day.

## 2024-04-05 NOTE — PROGRESS NOTES
April 5, 2024    Hello, may I speak with Concepcion Rousseau?    My name is Juanito       I am  with [facility]  [address] [address]  [facility] [address] [phone].    Before we get started may I verify your date of birth? 1995    I am calling to officially welcome you to our practice and ask about your recent visit. Is this a good time to talk? yes    Tell me about your visit with us. What things went well?  She was wonderful        We're always looking for ways to make our patients' experiences even better. Do you have recommendations on ways we may improve?  no    Overall were you satisfied with your first visit to our practice? yes       I appreciate you taking the time to speak with me today. Is there anything else I can do for you? no      Thank you, and have a great day.

## 2024-08-02 ENCOUNTER — OFFICE VISIT (OUTPATIENT)
Dept: INTERNAL MEDICINE | Facility: CLINIC | Age: 29
End: 2024-08-02
Payer: COMMERCIAL

## 2024-08-02 VITALS
TEMPERATURE: 97.7 F | SYSTOLIC BLOOD PRESSURE: 114 MMHG | BODY MASS INDEX: 26.6 KG/M2 | WEIGHT: 169.5 LBS | HEIGHT: 67 IN | DIASTOLIC BLOOD PRESSURE: 68 MMHG | OXYGEN SATURATION: 99 % | HEART RATE: 74 BPM

## 2024-08-02 DIAGNOSIS — F41.8 ANXIETY WITH DEPRESSION: Primary | ICD-10-CM

## 2024-08-02 DIAGNOSIS — E78.01 FAMILIAL HYPERCHOLESTEROLEMIA: ICD-10-CM

## 2024-08-02 PROCEDURE — 99214 OFFICE O/P EST MOD 30 MIN: CPT | Performed by: NURSE PRACTITIONER

## 2024-08-02 RX ORDER — BUSPIRONE HYDROCHLORIDE 10 MG/1
10 TABLET ORAL 2 TIMES DAILY
Qty: 60 TABLET | Refills: 5 | Status: SHIPPED | OUTPATIENT
Start: 2024-08-02

## 2024-08-02 RX ORDER — CYCLOBENZAPRINE HCL 10 MG
10 TABLET ORAL AS NEEDED
COMMUNITY
Start: 2024-05-01

## 2024-08-02 RX ORDER — EZETIMIBE 10 MG/1
10 TABLET ORAL DAILY
Qty: 90 TABLET | Refills: 1 | Status: SHIPPED | OUTPATIENT
Start: 2024-08-02

## 2024-08-02 NOTE — PATIENT INSTRUCTIONS
Suicidal Feelings: How to Help Yourself  Suicide is when you end your own life. Suicidal ideation includes expressing thoughts about, or a preoccupation with, ending your own life. There are many things you can do to help yourself feel better when struggling with these feelings. Many services and people are available to support you and others who struggle with similar feelings.  If you ever feel like you may hurt yourself or others, or have thoughts about taking your own life, get help right away. To get help:  Go to your nearest emergency department.  Call your local emergency services (001 in the [place]  Call the [facility] and [facility] services helpline (211 in the [place]  Call or text a suicide hotline to speak with a trained counselor. The following suicide hotlines are available in the [place]:  8-457-154-TALK (1-910.523.1556 or 985 in the [place]  0-082-ANBUEYG (1-455.816.2626).  Text 209731. This is the Crisis Text Line in the [place]  1-510.738.9817. This is a hotline for [place] speakers.  1-895.461.9360. This is a hotline for TTY users.  7-769-1-U-TERRA (1-823.105.8025). This is a hotline for lesbian, yao, bisexual, transgender, or questioning youth.  For a list of hotlines in [place], visit suicide.org/hotlines/international/djfddr-kftlhbv-nzyptntk.html  Contact a crisis center or a local suicide prevention center. To find a crisis center or suicide prevention center:  Call your local hospital, clinic, community service organization, mental health center, social service provider, or health department. Ask for help with connecting to a crisis center.  For a list of crisis centers in the [place], visit: suicidepreventionlifeline.org  For a list of crisis centers in [place], visit: suicideprevention.ca  How to help yourself feel better    Promise yourself that you will not do anything bad or extreme when you have suicidal feelings. Remember the times you have felt hopeful.  Many people have  gotten through suicidal thoughts and feelings, and you can too.  If you have had these feelings before, remind yourself that you can get through them again.  Let family, friends, teachers, or counselors know how you are feeling. Do not separate yourself from those who care about you and want to help you.  Talk with someone every day, even if you do not feel like talking to anyone or being with other people.  Face-to-face conversation is best to help them understand your feelings.  Contact a mental health care provider and work with this person regularly.  Make a safety plan that you can follow during a crisis.  Include phone numbers of suicide prevention hotlines, mental health professionals, and trusted friends and family members you can call during an emergency.  Save these numbers on your phone.  If you are thinking of taking a lot of medicine, give your medicine to someone who can give it to you as prescribed.  If you are on antidepressants and are concerned you will overdose, tell your health care provider so that he or she can give you safer medicines.  Try to stick to your routines and follow a schedule every day. Make self-care a priority.  Make a list of realistic goals, and cross them off when you achieve them. Accomplishments can give you a sense of worth.  Wait until you are feeling better before doing things that you find difficult or unpleasant.  Do things that you have always enjoyed to take your mind off your feelings.  Try reading a book, or listening to or playing music.  Spending time outside, in nature, may help you feel better.  Follow these instructions at home:    Visit your primary health care provider every year for a physical and a mental health checkup.  Take over-the-counter and prescription medicines only as told by your health care provider.  Ask your health care provider about the possible side effects of any medicines you are taking.  Ask your health care provider about whether  suicidal ideation is a possible side effect of any of your medicines.  Learn about suicidal ideation and what increases the risk for the development of suicidal thoughts.  Eat a well-balanced diet, and eat regular meals.  Get plenty of rest.  Exercise if you are able. Just 30 minutes of exercise each day can help you feel better.  Keep your living space well lit.  Do not use alcohol or drugs. Remove these substances from your home.  General recommendations  Remove weapons, poisons, knives, and other deadly items from your home.  Work with a mental health care provider as needed.  When you are feeling well, write yourself a letter with tips and support that you can read when you are not feeling well.  Remember that life's difficulties can be sorted out with help. Conditions can be treated, and you can learn behaviors and ways of thinking that will help you.  Work with your health care provider or counselor to learn ways of coping with your thoughts and feelings.  Where to find more information  National Suicide Prevention Lifeline: www.suicidepreventionlifeline.org  Hopeline: www.hopeline.[employer]  American Foundation for Suicide Prevention: www.afsp.org  The Jeanmarie Project (for lesbian, yao, bisexual, transgender, or questioning youth): www.thetrevorproject.org  National [employer] of Mental Health: www.nimh.nih.gov/health/topics/suicide-prevention  Suicide Prevention Resources: afsp.org/suicide-prevention-resources  Contact a health care provider if:  You feel as though you are a burden to others.  You feel agitated, angry, vengeful, or have extreme mood swings.  You have withdrawn from family and friends.  You are frequently using drugs or alcohol.  Get help right away if:  You are talking about suicide or wishing to die.  You start making plans for how to commit suicide.  You feel that you have no reason to live.  You start making plans for putting your affairs in order, saying goodbye, or giving your possessions  away.  You feel guilt, shame, or unbearable pain, and it seems like there is no way out.  You are engaging in risky behaviors that could lead to death.  If you have any of these thoughts or symptoms, get help right away:  Go to your nearest emergency department or crisis center.  Call emergency services (911 in the [place]  Call or text a suicide crisis helpline.  Summary  Suicide is when you take your own life. Suicidal feelings are thoughts about ending your own life.  Promise yourself that you will not do anything bad or extreme when you have suicidal feelings.  Let family, friends, teachers, or counselors know how you are feeling.  Get help right away if you start making plans for how to commit suicide.  This information is not intended to replace advice given to you by your health care provider. Make sure you discuss any questions you have with your health care provider.  Document Revised: 07/14/2022 Document Reviewed: 04/28/2022  Elsevier Patient Education © 2024 [employer].

## 2024-08-02 NOTE — PROGRESS NOTES
Subjective   Concepcion Rousseau is a 28 y.o. female.     Chief Complaint   Patient presents with    Hyperlipidemia    Anxiety        History of Present Illness   She is here today for 6-month follow-up for hyperlipidemia and to discuss anxiety.    Familial hypercholesterolemia-she is currently on Zetia 10 mg daily.  She is due for repeat lab work. She is fasting today.     Anxiety with depression- she notes a lot of life changes over the past 6 months. She got , bought a house and her  started a new job.  She notes a lot of stress pertaining to her job.  She notes over the past few weeks more anxiety and stress. She will experience heart racing and tightness in the chest when her anxiety is high. She denies any sleep disturbance. She has a hx of anxiety, previously prescribed twice daily buspar with improvement.  She notes intermittent feelings of depression and anhedonia.  She notes this is typically in the week leading up to her menses.  She will have occasional transient thoughts of not waking up.  She denies any plan, intent or means to carry out a plan.  She has previously seen a therapist and is looking into starting talk therapy again.  She has booked a massage for next week and is trying to work on self-care and stress management techniques.  She denies any current suicidal thoughts.    The following portions of the patient's history were reviewed and updated as appropriate: allergies, current medications, past family history, past medical history, past social history, past surgical history, and problem list.    Review of Systems   Constitutional: Negative.    Respiratory: Negative.     Cardiovascular: Negative.    Psychiatric/Behavioral:  Positive for suicidal ideas (transient thoughts. No current thoughts, intent, plan or means to carry out a plan), depressed mood and stress. Negative for self-injury and sleep disturbance. The patient is nervous/anxious.        Objective   Physical  Exam  Constitutional:       Appearance: She is well-developed.   Neck:      Thyroid: No thyroid mass, thyromegaly or thyroid tenderness.      Vascular: No carotid bruit.      Trachea: Trachea normal.   Cardiovascular:      Rate and Rhythm: Normal rate and regular rhythm.      Chest Wall: PMI is not displaced.      Pulses:           Radial pulses are 2+ on the right side and 2+ on the left side.        Dorsalis pedis pulses are 2+ on the right side and 2+ on the left side.        Posterior tibial pulses are 2+ on the right side and 2+ on the left side.      Heart sounds: S1 normal and S2 normal.   Pulmonary:      Effort: Pulmonary effort is normal.      Breath sounds: Normal breath sounds.   Musculoskeletal:      Right lower leg: No edema.      Left lower leg: No edema.   Lymphadenopathy:      Head:      Right side of head: No submental, submandibular, tonsillar or occipital adenopathy.      Left side of head: No submental, submandibular, tonsillar or occipital adenopathy.      Cervical: No cervical adenopathy.   Skin:     General: Skin is warm and dry.      Capillary Refill: Capillary refill takes less than 2 seconds.      Nails: There is no clubbing.   Neurological:      Mental Status: She is alert and oriented to person, place, and time.   Psychiatric:         Attention and Perception: Attention and perception normal.         Mood and Affect: Affect normal. Mood is anxious. Affect is tearful.         Speech: Speech normal.         Behavior: Behavior normal. Behavior is cooperative.         Thought Content: Thought content normal.         Cognition and Memory: Cognition and memory normal.         Judgment: Judgment normal.         Vitals:    08/02/24 0937   BP: 114/68   Pulse: 74   Temp: 97.7 °F (36.5 °C)   SpO2: 99%      Body mass index is 26.54 kg/m².    Assessment & Plan   Problems Addressed this Visit       Familial hypercholesterolemia    Relevant Medications    ezetimibe (ZETIA) 10 MG tablet    Anxiety with  depression - Primary    Relevant Medications    busPIRone (BUSPAR) 10 MG tablet     Diagnoses         Codes Comments    Anxiety with depression    -  Primary ICD-10-CM: F41.8  ICD-9-CM: 300.4     Familial hypercholesterolemia     ICD-10-CM: E78.01  ICD-9-CM: 272.0           Patient screened positive for depression based on a PHQ-9 score of 5 on 8/2/2024. Follow-up recommendations include:  Encouraged her to schedule an appointment with her therapist.  Discussed initiation of SSRI medication but patient defers today.  Will work on better control of anxiety.  Discussed with her suicidal thoughts returned to call the [place] crisis line will be evaluated in the ER .    1.  Anxiety with depression-not controlled.  PHQ 2 score 5 and AHSAN score of 10 today in the office.  Discussed initiation of SSRI medication for possible PMDD.  She would like to wait on this for now.  Will restart buspirone 10 mg twice daily.  Encouraged her to schedule an appointment with her therapist for talk therapy.  She denies any current suicidal thoughts, intent, plan or means to carry out a plan.  Discussed with her if suicidal thoughts return to call the [facility] crisis hotline or be evaluated in the ER.  Encouraged relaxation techniques including guided imagery as well as self-care activities.  Will plan to follow-up in 4 weeks for medication check.  2.  Familial hypercholesterolemia-check labs today.  Continue Zetia 10 mg daily along with Mediterranean-style eating and return to regular exercise.    Follow-up in 4 weeks for anxiety and depression.

## 2024-08-05 DIAGNOSIS — R74.8 ELEVATED LIVER ENZYMES: Primary | ICD-10-CM

## 2024-08-23 ENCOUNTER — TELEPHONE (OUTPATIENT)
Dept: SURGERY | Facility: CLINIC | Age: 29
End: 2024-08-23
Payer: COMMERCIAL

## 2024-08-23 DIAGNOSIS — K62.89 RECTAL PAIN: Primary | ICD-10-CM

## 2024-08-23 RX ORDER — SODIUM CHLORIDE, SODIUM LACTATE, POTASSIUM CHLORIDE, CALCIUM CHLORIDE 600; 310; 30; 20 MG/100ML; MG/100ML; MG/100ML; MG/100ML
30 INJECTION, SOLUTION INTRAVENOUS CONTINUOUS
OUTPATIENT
Start: 2024-08-23

## 2024-08-29 ENCOUNTER — OFFICE VISIT (OUTPATIENT)
Dept: INTERNAL MEDICINE | Facility: CLINIC | Age: 29
End: 2024-08-29
Payer: COMMERCIAL

## 2024-08-29 VITALS
DIASTOLIC BLOOD PRESSURE: 62 MMHG | HEIGHT: 67 IN | TEMPERATURE: 98.1 F | OXYGEN SATURATION: 99 % | SYSTOLIC BLOOD PRESSURE: 102 MMHG | BODY MASS INDEX: 26.1 KG/M2 | WEIGHT: 166.3 LBS | HEART RATE: 73 BPM

## 2024-08-29 DIAGNOSIS — F41.8 ANXIETY WITH DEPRESSION: Primary | ICD-10-CM

## 2024-08-29 DIAGNOSIS — R74.8 ELEVATED LIVER ENZYMES: ICD-10-CM

## 2024-08-29 PROCEDURE — 99213 OFFICE O/P EST LOW 20 MIN: CPT | Performed by: NURSE PRACTITIONER

## 2024-08-29 NOTE — PROGRESS NOTES
Subjective   Concepcion Rousseau is a 28 y.o. female.     Chief Complaint   Patient presents with    Anxiety    Depression        History of Present Illness   She is here today for 1 month follow-up for anxiety and depression.  She is also fasting today for follow-up lab work for elevated liver enzymes.  At last office visit started BuSpar 10 mg twice a day.  Discussed with her initiation of SSRI medication for possible PMDD, but patient deferred at the time.  She notes significant improvement in mood since last office visit. She has not been experiencing any depression or SI leading up to her menses.  She notes she initially started taking the buspirone 10 mg once daily for the first few weeks then transition to as needed dosing.  She notes significant improvement in anxiety and depression.  She denies any medication side effects.  She has been working on healthier eating and exercise.    The following portions of the patient's history were reviewed and updated as appropriate: allergies, current medications, past family history, past medical history, past social history, past surgical history, and problem list.    Review of Systems   Constitutional: Negative.    Respiratory: Negative.     Cardiovascular: Negative.    Gastrointestinal: Negative.    Psychiatric/Behavioral:  Negative for agitation, sleep disturbance, suicidal ideas and depressed mood. The patient is nervous/anxious (improving).        Objective   Physical Exam  Constitutional:       Appearance: She is well-developed.   Neck:      Thyroid: No thyroid mass, thyromegaly or thyroid tenderness.      Vascular: No carotid bruit.      Trachea: Trachea normal.   Cardiovascular:      Rate and Rhythm: Normal rate and regular rhythm.      Chest Wall: PMI is not displaced.      Pulses:           Radial pulses are 2+ on the right side and 2+ on the left side.        Dorsalis pedis pulses are 2+ on the right side and 2+ on the left side.        Posterior tibial pulses  are 2+ on the right side and 2+ on the left side.      Heart sounds: S1 normal and S2 normal.   Pulmonary:      Effort: Pulmonary effort is normal.      Breath sounds: Normal breath sounds.   Abdominal:      General: Bowel sounds are normal. There is no distension or abdominal bruit. There are no signs of injury.      Palpations: Abdomen is soft. There is no hepatomegaly or splenomegaly.      Tenderness: There is no abdominal tenderness.   Musculoskeletal:      Right lower leg: No edema.      Left lower leg: No edema.   Lymphadenopathy:      Head:      Right side of head: No submental, submandibular, tonsillar or occipital adenopathy.      Left side of head: No submental, submandibular, tonsillar or occipital adenopathy.      Cervical: No cervical adenopathy.   Skin:     General: Skin is warm and dry.      Capillary Refill: Capillary refill takes less than 2 seconds.      Nails: There is no clubbing.   Neurological:      Mental Status: She is alert and oriented to person, place, and time.   Psychiatric:         Attention and Perception: Attention and perception normal.         Mood and Affect: Mood and affect normal.         Speech: Speech normal.         Behavior: Behavior normal. Behavior is cooperative.         Thought Content: Thought content normal.         Cognition and Memory: Cognition and memory normal.         Judgment: Judgment normal.         Vitals:    08/29/24 0922   BP: 102/62   Pulse: 73   Temp: 98.1 °F (36.7 °C)   SpO2: 99%      Body mass index is 26.04 kg/m².    Assessment & Plan   Problems Addressed this Visit       Anxiety with depression - Primary     Other Visit Diagnoses       Elevated liver enzymes              Diagnoses         Codes Comments    Anxiety with depression    -  Primary ICD-10-CM: F41.8  ICD-9-CM: 300.4     Elevated liver enzymes     ICD-10-CM: R74.8  ICD-9-CM: 790.5           Patient screened positive for depression based on a PHQ-9 score of 0 on 8/29/2024. Follow-up  recommendations include:  improved. Discussed with her she can try calcium or a B complex the week leading up to her menses to help with mood changes .    1.  Anxiety with depression-significantly improved.  Recommend continuing buspirone 10 mg twice daily as needed for anxiety.  Encouraged healthy lifestyle.  Okay to try a daily calcium supplement 600 mg twice daily or B complex the week leading up to her menses if mood changes return.  Discussed with her to notify for worsening symptoms.  Follow-up in 5 months or sooner as needed.  2.  Elevated liver enzymes-check hepatic function panel today.  Encouraged her to continue healthy lifestyle with Mediterranean-style eating and regular exercise.  If liver enzymes are still elevated recommend additional liver lab workup and liver ultrasound.

## 2024-08-30 DIAGNOSIS — R74.8 ELEVATED LIVER ENZYMES: Primary | ICD-10-CM

## 2024-09-16 ENCOUNTER — ANESTHESIA (OUTPATIENT)
Dept: GASTROENTEROLOGY | Facility: HOSPITAL | Age: 29
End: 2024-09-16
Payer: COMMERCIAL

## 2024-09-16 ENCOUNTER — ANESTHESIA EVENT (OUTPATIENT)
Dept: GASTROENTEROLOGY | Facility: HOSPITAL | Age: 29
End: 2024-09-16
Payer: COMMERCIAL

## 2024-09-16 ENCOUNTER — HOSPITAL ENCOUNTER (OUTPATIENT)
Facility: HOSPITAL | Age: 29
Setting detail: HOSPITAL OUTPATIENT SURGERY
Discharge: HOME | End: 2024-09-16
Attending: COLON & RECTAL SURGERY | Admitting: COLON & RECTAL SURGERY
Payer: COMMERCIAL

## 2024-09-16 VITALS
WEIGHT: 163.2 LBS | SYSTOLIC BLOOD PRESSURE: 100 MMHG | OXYGEN SATURATION: 99 % | HEART RATE: 69 BPM | HEIGHT: 66 IN | BODY MASS INDEX: 26.23 KG/M2 | DIASTOLIC BLOOD PRESSURE: 72 MMHG | RESPIRATION RATE: 16 BRPM

## 2024-09-16 DIAGNOSIS — K62.89 RECTAL PAIN: ICD-10-CM

## 2024-09-16 LAB
B-HCG UR QL: NEGATIVE
EXPIRATION DATE: NORMAL
INTERNAL NEGATIVE CONTROL: NEGATIVE
INTERNAL POSITIVE CONTROL: POSITIVE
Lab: NORMAL

## 2024-09-16 PROCEDURE — 81025 URINE PREGNANCY TEST: CPT | Performed by: PHYSICIAN ASSISTANT

## 2024-09-16 PROCEDURE — 25810000003 LACTATED RINGERS PER 1000 ML: Performed by: NURSE ANESTHETIST, CERTIFIED REGISTERED

## 2024-09-16 PROCEDURE — 88305 TISSUE EXAM BY PATHOLOGIST: CPT | Performed by: COLON & RECTAL SURGERY

## 2024-09-16 PROCEDURE — 25010000002 PROPOFOL 10 MG/ML EMULSION: Performed by: NURSE ANESTHETIST, CERTIFIED REGISTERED

## 2024-09-16 PROCEDURE — 25810000003 LACTATED RINGERS PER 1000 ML: Performed by: PHYSICIAN ASSISTANT

## 2024-09-16 RX ORDER — LIDOCAINE HYDROCHLORIDE 20 MG/ML
INJECTION, SOLUTION INFILTRATION; PERINEURAL AS NEEDED
Status: DISCONTINUED | OUTPATIENT
Start: 2024-09-16 | End: 2024-09-16 | Stop reason: SURG

## 2024-09-16 RX ORDER — SODIUM CHLORIDE, SODIUM LACTATE, POTASSIUM CHLORIDE, CALCIUM CHLORIDE 600; 310; 30; 20 MG/100ML; MG/100ML; MG/100ML; MG/100ML
INJECTION, SOLUTION INTRAVENOUS CONTINUOUS PRN
Status: DISCONTINUED | OUTPATIENT
Start: 2024-09-16 | End: 2024-09-16 | Stop reason: SURG

## 2024-09-16 RX ORDER — SODIUM CHLORIDE, SODIUM LACTATE, POTASSIUM CHLORIDE, CALCIUM CHLORIDE 600; 310; 30; 20 MG/100ML; MG/100ML; MG/100ML; MG/100ML
30 INJECTION, SOLUTION INTRAVENOUS CONTINUOUS
Status: DISCONTINUED | OUTPATIENT
Start: 2024-09-16 | End: 2024-09-16 | Stop reason: HOSPADM

## 2024-09-16 RX ORDER — PROPOFOL 10 MG/ML
VIAL (ML) INTRAVENOUS AS NEEDED
Status: DISCONTINUED | OUTPATIENT
Start: 2024-09-16 | End: 2024-09-16 | Stop reason: SURG

## 2024-09-16 RX ADMIN — PROPOFOL 80 MG: 10 INJECTION, EMULSION INTRAVENOUS at 12:37

## 2024-09-16 RX ADMIN — SODIUM CHLORIDE, POTASSIUM CHLORIDE, SODIUM LACTATE AND CALCIUM CHLORIDE: 600; 310; 30; 20 INJECTION, SOLUTION INTRAVENOUS at 12:37

## 2024-09-16 RX ADMIN — PROPOFOL 160 MCG/KG/MIN: 10 INJECTION, EMULSION INTRAVENOUS at 12:38

## 2024-09-16 RX ADMIN — LIDOCAINE HYDROCHLORIDE 60 MG: 20 INJECTION, SOLUTION INFILTRATION; PERINEURAL at 12:37

## 2024-09-16 RX ADMIN — SODIUM CHLORIDE, POTASSIUM CHLORIDE, SODIUM LACTATE AND CALCIUM CHLORIDE 30 ML/HR: 600; 310; 30; 20 INJECTION, SOLUTION INTRAVENOUS at 12:13

## 2024-09-16 RX ADMIN — PROPOFOL 50 MG: 10 INJECTION, EMULSION INTRAVENOUS at 12:43

## 2024-09-17 LAB
CYTO UR: NORMAL
LAB AP CASE REPORT: NORMAL
PATH REPORT.FINAL DX SPEC: NORMAL
PATH REPORT.GROSS SPEC: NORMAL

## 2024-09-18 PROBLEM — K63.5 HYPERPLASTIC COLON POLYP: Status: ACTIVE | Noted: 2024-09-18

## 2025-01-25 DIAGNOSIS — F41.8 ANXIETY WITH DEPRESSION: ICD-10-CM

## 2025-01-27 RX ORDER — BUSPIRONE HYDROCHLORIDE 10 MG/1
10 TABLET ORAL 2 TIMES DAILY
Qty: 180 TABLET | Refills: 0 | Status: SHIPPED | OUTPATIENT
Start: 2025-01-27

## (undated) DEVICE — SENSR O2 OXIMAX FNGR A/ 18IN NONSTR

## (undated) DEVICE — SINGLE-USE BIOPSY FORCEPS: Brand: RADIAL JAW 4

## (undated) DEVICE — LN SMPL CO2 SHTRM SD STREAM W/M LUER

## (undated) DEVICE — KT ORCA ORCAPOD DISP STRL

## (undated) DEVICE — ADAPT CLN BIOGUARD AIR/H2O DISP

## (undated) DEVICE — CANN O2 ETCO2 FITS ALL CONN CO2 SMPL A/ 7IN DISP LF

## (undated) DEVICE — TUBING, SUCTION, 1/4" X 10', STRAIGHT: Brand: MEDLINE